# Patient Record
Sex: MALE | Race: WHITE | NOT HISPANIC OR LATINO | Employment: UNEMPLOYED | URBAN - METROPOLITAN AREA
[De-identification: names, ages, dates, MRNs, and addresses within clinical notes are randomized per-mention and may not be internally consistent; named-entity substitution may affect disease eponyms.]

---

## 2020-01-18 ENCOUNTER — HOSPITAL ENCOUNTER (EMERGENCY)
Facility: HOSPITAL | Age: 25
Discharge: HOME | End: 2020-01-18
Attending: EMERGENCY MEDICINE | Admitting: EMERGENCY MEDICINE

## 2020-01-18 VITALS
BODY MASS INDEX: 24.81 KG/M2 | RESPIRATION RATE: 16 BRPM | TEMPERATURE: 98.6 F | SYSTOLIC BLOOD PRESSURE: 142 MMHG | OXYGEN SATURATION: 100 % | WEIGHT: 199.52 LBS | HEIGHT: 75 IN | HEART RATE: 80 BPM | DIASTOLIC BLOOD PRESSURE: 65 MMHG

## 2020-01-18 DIAGNOSIS — J02.9 PHARYNGITIS, UNSPECIFIED ETIOLOGY: Primary | ICD-10-CM

## 2020-01-18 LAB — S PYO AG THROAT QL: NEGATIVE

## 2020-01-18 PROCEDURE — 99283 EMERGENCY DEPT VISIT LOW MDM: CPT

## 2020-01-18 PROCEDURE — 25010000002 KETOROLAC TROMETHAMINE PER 15 MG: Performed by: EMERGENCY MEDICINE

## 2020-01-18 PROCEDURE — 87880 STREP A ASSAY W/OPTIC: CPT | Performed by: EMERGENCY MEDICINE

## 2020-01-18 PROCEDURE — 87081 CULTURE SCREEN ONLY: CPT | Performed by: EMERGENCY MEDICINE

## 2020-01-18 PROCEDURE — 96372 THER/PROPH/DIAG INJ SC/IM: CPT

## 2020-01-18 RX ORDER — KETOROLAC TROMETHAMINE 30 MG/ML
60 INJECTION, SOLUTION INTRAMUSCULAR; INTRAVENOUS ONCE
Status: COMPLETED | OUTPATIENT
Start: 2020-01-18 | End: 2020-01-18

## 2020-01-18 RX ORDER — NAPROXEN 500 MG/1
500 TABLET ORAL 2 TIMES DAILY PRN
Qty: 12 TABLET | Refills: 0 | Status: SHIPPED | OUTPATIENT
Start: 2020-01-18

## 2020-01-18 RX ADMIN — KETOROLAC TROMETHAMINE 60 MG: 30 INJECTION, SOLUTION INTRAMUSCULAR at 03:29

## 2020-01-18 NOTE — ED PROVIDER NOTES
Subjective   [place]ke [place] is a 24 y.o male who presents to the ED with complaints of sore throat. The patient reports he began experiencing a sore throat on the left side with an onset 1 day ago. He describes it as a dull sensation that is exacerbated by eating. No fever, myalgias, nausea, vomiting, shortness of breath, or cough. There are no other acute symptoms at this time.      Sore Throat   Location:  Left  Quality: dull.  Pain severity now: 5/10.  Onset quality:  Sudden  Duration:  1 day  Timing:  Constant  Progression:  Unchanged  Chronicity:  New  Worsened by:  Eating  Associated symptoms: trouble swallowing    Associated symptoms: no cough, no fever and no shortness of breath        Review of Systems   Constitutional: Negative for fever.   HENT: Positive for sore throat and trouble swallowing.    Respiratory: Negative for cough and shortness of breath.    Gastrointestinal: Negative for nausea and vomiting.   Musculoskeletal: Negative for myalgias.   All other systems reviewed and are negative.      History reviewed. No pertinent past medical history.    No Known Allergies    History reviewed. No pertinent surgical history.    History reviewed. No pertinent family history.    Social History     Socioeconomic History   • Marital status: Single     Spouse name: Not on file   • Number of children: Not on file   • Years of education: Not on file   • Highest education level: Not on file         Objective   Physical Exam   Constitutional: He is oriented to person, place, and time. He appears well-developed and well-nourished. No distress.   HENT:   Head: Normocephalic and atraumatic.   Nose: Nose normal.   Mouth/Throat: Uvula is midline. Posterior oropharyngeal erythema present. Tonsillar exudate.   Pharynx posterior erythema. Mild tonsillar enlargement. Small amount of exudate noted on the left tonsil. No significant unilateral tonsillar swelling. No uvula deviation. Voice is normal. Breath sounds hard.   Eyes:  "Conjunctivae are normal. No scleral icterus.   Neck: Normal range of motion. Neck supple.   Anterior cervical lymphadenopathy.   Cardiovascular: Normal rate, regular rhythm and normal heart sounds.   No murmur heard.  Pulmonary/Chest: Effort normal and breath sounds normal. No respiratory distress.   Abdominal: Soft. There is no tenderness.   Musculoskeletal: Normal range of motion. He exhibits no edema.   Lymphadenopathy:     He has cervical adenopathy.   Neurological: He is alert and oriented to person, place, and time.   Skin: Skin is warm and dry.   Psychiatric: He has a normal mood and affect. His behavior is normal.   Nursing note and vitals reviewed.      Procedures         ED Course     Recent Results (from the past 24 hour(s))   Rapid Strep A Screen - Swab, Throat    Collection Time: 01/18/20  2:51 AM   Result Value Ref Range    Strep A Ag Negative Negative     Note: In addition to lab results from this visit, the labs listed above may include labs taken at another facility or during a different encounter within the last 24 hours. Please correlate lab times with ED admission and discharge times for further clarification of the services performed during this visit.    No orders to display     Vitals:    01/18/20 0241 01/18/20 0513   BP: 152/78 142/65   BP Location: Right arm    Patient Position: Sitting    Pulse: 85 80   Resp: 18 16   Temp: 98.6 °F (37 °C)    TempSrc: Oral    SpO2: 100% 100%   Weight: 90.5 kg (199 lb 8.3 oz)    Height: 190.5 cm (75\")      Medications   ketorolac (TORADOL) injection 60 mg (60 mg Intramuscular Given 1/18/20 0329)     ECG/EMG Results (last 24 hours)     ** No results found for the last 24 hours. **        No orders to display                                                MDM    Final diagnoses:   Pharyngitis, unspecified etiology       Documentation assistance provided by jamar Jacobo.  Information recorded by the scribblayne was done at my direction and has been verified " and validated by me.     Kade Jacobo  01/18/20 0255       Rafael Ndiaye DO  01/18/20 0831

## 2020-01-18 NOTE — DISCHARGE INSTRUCTIONS
Follow up with one of the [facility] Primary Care Providers below to setup primary care. If you need assistance coordinating a primary care appointment with a [facility] Primary Care Provider, please contact the Primary Care Coordinators at [phone] for appointment scheduling.    [facility], Primary Care   [address] , [address]   [place], [place] [id number] [phone]    [facility] Internal Medicine & Endocrinology  [address], [address]  [place] [address] [phone]    [facility] Family Medicine  [address], [address]   [place] [address] [phone]    [facility] Primary Care  [address], [address]  [place], [place] [id number] [phone]    [facility], Primary Care,   [address], [address]   [place], [place] [id number] [phone]    [facility] Primary [facility]  [address]  [place], [place] [id number] [phone]    [facility], Primary Care  [address], [address]  [place], [place] [id number] [phone]    [facility] Internal Medicine & Pediatrics  [address], [address]   [place] [address] [phone]    [facility], Primary Care  [address] [address] [phone]      [facility] Primary [facility]  [address], [address]   [place] [address] [phone]    [facility] Family Medicine  [address] [place] [address] [phone]

## 2020-01-21 LAB — BACTERIA SPEC AEROBE CULT: NORMAL
